# Patient Record
(demographics unavailable — no encounter records)

---

## 2024-11-15 NOTE — PHYSICAL EXAM
[Alert] : alert [No Acute Distress] : in no acute distress [EOMI] : extraocular movements were intact [Normal Appearance] : the appearance of the neck was normal [Supple] : the neck was supple [No Respiratory Distress] : no respiratory distress [No Acc Muscle Use] : no accessory muscle use [Auscultation Breath Sounds / Voice Sounds] : lungs were clear to auscultation bilaterally [Normal S1, S2] : normal S1 and S2 [Heart Rate And Rhythm] : heart rate was normal and rhythm regular [Abdomen Tenderness] : non-tender [Abdomen Soft] : soft [No Spinal Tenderness] : no spinal tenderness [Normal Color / Pigmentation] : normal skin color and pigmentation [No Focal Deficits] : no focal deficits [Normal Affect] : the affect was normal [Normal Mood] : the mood was normal

## 2024-11-15 NOTE — ASSESSMENT
[FreeTextEntry1] :  Total time spent: 60 mins This includes chart review, patient assessment, discussion and collaboration with interdisciplinary team members, excluding time spent on separately billable services.

## 2024-11-15 NOTE — HISTORY OF PRESENT ILLNESS
[Patient reported hearing was abnormal] : Patient reported hearing was abnormal [Patient reported vision is normal] : Patient reported vision is normal [Patient reported Patient reported dental screening is normal] : Patient reported dental screening is normal [Patient reported breast cancer screening was normal] : Patient reported breast cancer screening was normal [No falls in past year] : Patient reported no falls in the past year [Patient is independent with] : bathing [Independent] : managing finances [] : Assistance needed with meal preparation. [Full assistance needed] : Assistance needed managing medications [1] : 2) Feeling down, depressed, or hopeless for several days (1) [PHQ-2 Positive] : PHQ-2 Positive [Several Days (1)] : 7.) Trouble concentrating on things, such as reading a newspaper or watching television? Several days [Not at All (0)] : 8.) Moving or speaking so slowly that other people could have noticed, or the opposite, moving or speaking faster than usual? Not at all [1/2 of Days or More (2)] : 9.) Thoughts that you would be off dead or of hurting yourself in some way? Half the days or more [Mild] : Severity of Depression is Mild [PHQ-9 Positive] : PHQ-9 Positive [Designated Healthcare Proxy] : Designated healthcare proxy [Name: ___] : Health Care Proxy's Name: [unfilled]  [Relationship: ___] : Relationship: [unfilled] [I will adhere to the patient's wishes.] : I will adhere to the patient's wishes. [Little interest or pleasure doing things] : 1) Little interest or pleasure doing things [Feeling down, depressed, or hopeless] : 2) Feeling down, depressed, or hopeless [I have developed a follow-up plan documented below in the note.] : I have developed a follow-up plan documented below in the note. [FreeTextEntry1] : 83 yo female w/ a hx of CLL (dx:1996), hx of breast cancer (s/p mastectomy), hld, memory loss presents to the office to establish care. Patient is accompanied by her daughter and  who help provide history as well. She just moved in with her daughter from [place]. Patient reports that she started to have cognitive difficulties over the last few years. At times it takes her a while to comprehend what someone is saying to her in a conversation. She has trouble with short term memory. Long  term memory is intact. She is independent of all ADLs and some iADLs. She reports she continues to drive and can find "old places" but has trouble finding new places.   She has 2 sons and 1 daughter. Her son Bryon is HCP. Patients  believes they have forms for DNR/DNI at home. He will confirm at the next visit.   Immunizations: need records PCV: completed  FLu: completed  RSV: due  Covid-due  Shingles: completed  [TextBox_25] : had a DEXA [TextBox_31] : has hearing aids  [TextBox_19] : aged out, normal in the past  [de-identified] : has trouble with smart phones  [de-identified] :  always managed finances  [[id number]] : 7 [FreeTextEntry4] : will look at hcp form at home

## 2024-11-15 NOTE — REVIEW OF SYSTEMS
[Loss Of Hearing] : hearing loss [Depression] : depression [Fever] : no fever [Chills] : no chills [Feeling Poorly] : not feeling poorly [Feeling Tired] : not feeling tired [Heart Rate Is Slow] : the heart rate was not slow [Heart Rate Is Fast] : the heart rate was not fast [Chest Pain] : no chest pain [Palpitations] : no palpitations [Lower Ext Edema] : no lower extremity edema [Shortness Of Breath] : no shortness of breath [Wheezing] : no wheezing [Cough] : no cough [Abdominal Pain] : no abdominal pain [Vomiting] : no vomiting [Constipation] : no constipation [Diarrhea] : no diarrhea [Dysuria] : no dysuria [Confused] : no confusion [Dizziness] : no dizziness [Sleep Disturbances] : no sleep disturbances [Anxiety] : no anxiety

## 2025-04-07 NOTE — HISTORY OF PRESENT ILLNESS
[Completely Independent] : Completely independent. [Independent] : with traveling/transport [] : Assistance needed with meal preparation. [No falls in past year] : Patient reported no falls in the past year [Little interest or pleasure doing things] : 1) Little interest or pleasure doing things [0] : 1) Little interest or pleasure doing things: Not at all (0) [Feeling down, depressed, or hopeless] : 2) Feeling down, depressed, or hopeless [1] : 2) Feeling down, depressed, or hopeless for several days (1) [PHQ-2 Negative - No further assessment needed] : PHQ-2 Negative - No further assessment needed [I have developed a follow-up plan documented below in the note.] : I have developed a follow-up plan documented below in the note. [FreeTextEntry1] : 81 yo female w/ a hx of CLL (dx:1996), hx of breast cancer (s/p mastectomy), hld, dementia presents to the office for a follow up visit. Pt is accompanied by her daughter and  who helps provide history. No acute complaints reported. Denies any falls, chest pain, sob, dizziness, n/v/d or constipation. Reports her mood has improved.   Hypothyroidism Taking levothyroxine 50mcg daily.   Dementia  Stage 4 Taking donepezil 10mg daily Participating in [facility] memory program twice a week.   HLD Taking atorvastatin 10mg daily.  Immunizations: need records PCV: completed  FLu: completed  RSV: due  Covid-due  Shingles: completed  [de-identified] : gets confused with smart phones  [[id number]] : 1

## 2025-04-07 NOTE — PHYSICAL EXAM
[Alert] : alert [EOMI] : extraocular movements were intact [Normal Appearance] : the appearance of the neck was normal [Supple] : the neck was supple [No Respiratory Distress] : no respiratory distress [No Acc Muscle Use] : no accessory muscle use [Auscultation Breath Sounds / Voice Sounds] : lungs were clear to auscultation bilaterally [Normal S1, S2] : normal S1 and S2 [Heart Rate And Rhythm] : heart rate was normal and rhythm regular [Abdomen Tenderness] : non-tender [Abdomen Soft] : soft [No Spinal Tenderness] : no spinal tenderness [Normal Color / Pigmentation] : normal skin color and pigmentation [No Focal Deficits] : no focal deficits [Normal Affect] : the affect was normal [Normal Mood] : the mood was normal

## 2025-04-07 NOTE — REVIEW OF SYSTEMS
[Depression] : depression [Fever] : no fever [Feeling Tired] : not feeling tired [Loss Of Hearing] : no hearing loss [Heart Rate Is Slow] : the heart rate was not slow [Heart Rate Is Fast] : the heart rate was not fast [Chest Pain] : no chest pain [Palpitations] : no palpitations [Lower Ext Edema] : no lower extremity edema [Shortness Of Breath] : no shortness of breath [Wheezing] : no wheezing [Cough] : no cough [Abdominal Pain] : no abdominal pain [Vomiting] : no vomiting [Constipation] : no constipation [Diarrhea] : no diarrhea [Dysuria] : no dysuria [Confused] : no confusion [Dizziness] : no dizziness